# Patient Record
Sex: FEMALE | Race: OTHER | HISPANIC OR LATINO | URBAN - METROPOLITAN AREA
[De-identification: names, ages, dates, MRNs, and addresses within clinical notes are randomized per-mention and may not be internally consistent; named-entity substitution may affect disease eponyms.]

---

## 2022-07-05 ENCOUNTER — OUTPATIENT (OUTPATIENT)
Dept: OUTPATIENT SERVICES | Facility: HOSPITAL | Age: 42
LOS: 1 days | Discharge: HOME | End: 2022-07-05

## 2022-07-06 DIAGNOSIS — F31.9 BIPOLAR DISORDER, UNSPECIFIED: ICD-10-CM

## 2024-10-08 ENCOUNTER — EMERGENCY (EMERGENCY)
Facility: HOSPITAL | Age: 44
LOS: 1 days | Discharge: HOME | End: 2024-10-08
Attending: EMERGENCY MEDICINE
Payer: SELF-PAY

## 2024-10-08 VITALS
WEIGHT: 156.97 LBS | SYSTOLIC BLOOD PRESSURE: 127 MMHG | TEMPERATURE: 98 F | DIASTOLIC BLOOD PRESSURE: 85 MMHG | HEART RATE: 76 BPM | RESPIRATION RATE: 18 BRPM | OXYGEN SATURATION: 98 %

## 2024-10-08 PROCEDURE — 99284 EMERGENCY DEPT VISIT MOD MDM: CPT

## 2024-10-08 PROCEDURE — 99283 EMERGENCY DEPT VISIT LOW MDM: CPT

## 2024-10-08 RX ADMIN — Medication 600 MILLIGRAM(S): at 15:35

## 2024-10-08 RX ADMIN — Medication 600 MILLIGRAM(S): at 15:17

## 2024-10-08 NOTE — ED PROVIDER NOTE - NS ED ROS FT
Constitutional: (-) fever (-) chills  HENT: (-) congestion (-) rhinorrhea (-) sore throat (+) facial pain  Eyes: (-) pain (-) redness  Respiratory: (-) cough (-) shortness of breath (-) wheezing (-) stridor  Cardiovascular: (-) chest pain (-) palpitations (-) leg swelling  Gastrointestinal: (-) abdominal pain (-) blood in stool (no melena/hematochezia) (-) diarrhea (-) vomiting  Genitourinary: (-) dysuria (-) hematuria  Musculoskeletal: (-) gait problem (-) joint swelling (-) myalgias  Skin: (+) abrasion  Neurological: (-) weakness (-) numbness (-) headaches  Psychiatric/Behavioral: (-) confusion

## 2024-10-08 NOTE — ED PROVIDER NOTE - PATIENT PORTAL LINK FT
You can access the FollowMyHealth Patient Portal offered by [facility] by registering at the following website: [email or web address]. By joining [facility]’s FollowMyHealth portal, you will also be able to view your health information using other applications (apps) compatible with our system.

## 2024-10-08 NOTE — ED PROVIDER NOTE - NSFOLLOWUPCLINICS_GEN_ALL_ED_FT
[facility]  Internal Medicine  [address] [place] [address]  Phone: [phone]  Fax: [phone]  Follow Up Time: 1-3 Days

## 2024-10-08 NOTE — ED PROVIDER NOTE - OBJECTIVE STATEMENT
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   Patient presenting to the ED reporting closed head injury ~10 AM today.  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  + Wound to forehead.  No LOC.  No blood thinner use.  Patient in usual state health prior, no other injuries or complaints.

## 2024-10-08 NOTE — ED ADULT NURSE NOTE - OBJECTIVE STATEMENT
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 sustained lac on forehead no LOC

## 2024-10-08 NOTE — ED PROVIDER NOTE - PHYSICAL EXAMINATION
Gen:  Awake, alert, NAD, WDWN, non-toxic appearing. No skull/facial tender, no step-offs, no raccoon/mike.  Eyes:  PERRL, EOMI, no icterus, normal lids/lashes, normal conjunctivae.  ENT:  External inspection normal, pink/moist membranes. Pharynx normal, no pharyngeal erythema/exudate, no drooling, no lip/tongue/palate/posterior pharynx edema, midline uvula, no oropharyngeal ulcerations/lesions. No dental pain/tender, no loose teeth, no malocclusion, no bite deformity, no septal hematoma, no sinus/tmj/dental/temporal/mastoid tender.  CV:  S1S2, regular rate and rhythm, no murmur/gallops/rubs, no JVD, 2+ pulses b/l, no edema/cords/homans, good capillary refill, warm/well-perfused.  Resp:  Normal respiratory rate/effort, no respiratory distress, normal voice, speaking full sentences, lungs clear to auscultation b/l, no wheezing/rales/rhonchi, no retractions, no stridor.  Abd:  Soft abdomen, no tender/distended/guarding/rebound, no pulsatile mass, no CVA tender.   Musculoskeletal:  N/V intact, FROM all 4 extremities, normal motor tone, stable gait. Pelvis stable, no TLS spinal tender/deformity/step-offs, no rachel tender/deformity.  Neck:  FROM neck, supple, trachea midline, no meningismus. No C-spine tender/deformity/step-offs. Nexus negative.  Skin:  Color normal for race, warm and dry, no rash. R forehead 0.5cm linear superficial abrasion, no bleeding or evidence of infection.  Neuro:  Oriented x3, CN 2-12 intact, normal motor, normal sensory, normal gait. GCS 15  Psych:  Attention normal. Affect normal. Behavior normal. Judgment normal.

## 2024-10-08 NOTE — ED PROVIDER NOTE - CLINICAL SUMMARY MEDICAL DECISION MAKING FREE TEXT BOX
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   Patient presenting to the ED reporting closed head injury ~10 AM today.  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  + Wound to forehead.  No LOC.  No blood thinner use.  Patient in usual state health prior, no other injuries or complaints.    Vitals stable.  Nontoxic appearing, n/v intact.  Airway intact, no respiratory distress, no hypoxia.  No abdominal or CVA tenderness.  Nonfocal neuro.  Superficial abrasion noted to right forehead with no need for laceration repair.  Low suspicion of intracranial hemorrhage.  No indication for advanced imaging/CT at this time.    Analgesia given  Patient advised regarding need for close outpatient follow up.  Patient stable for further care in outpatient setting. No indication for inpatient admission at this time. Patient advised regarding symptomatic & supportive care and symptoms to prompt ED return. Strict return precautions provided.

## 2024-10-08 NOTE — ED PROVIDER NOTE - NSFOLLOWUPINSTRUCTIONS_ED_ALL_ED_FT
Please follow up with your PMD or Medicine Clinic in 2-3 days.  Return to the ER for worsening or concerning symptoms.  Your results for testing will be available in the Follow My Health application which can be downloaded to your phone or checked online on a computer.  [email or web address].  Take Acetaminophen (Tylenol) 650mg every 6 hours as needed for pain or fever.  Take Ibuprofen (Advil or Motrin) 600mg every 6 hours as needed for pain or fever with food.  Apply ice to swollen areas  Keep wound clean and dry.  Apply antibiotic ointment to affected area twice a day.    - - - - - - - - - - - - -  Head Injury, Adult  Three rear views of the head showing how quick, sudden head movements injure the brain.  There are many types of head injuries. They can be as minor as a small bump. Some head injuries can be worse. Worse injuries include:  A strong hit to the head that shakes the brain back and forth, causing damage (concussion).  A bruise (contusion) of the brain. This means there is bleeding in the brain that can cause swelling.  A cracked skull (skull fracture).  Bleeding in the brain that gathers, gets thick (makes a clot), and forms a bump (hematoma).  Most problems from a head injury come in the first 24 hours. However, you may still have side effects up to 7–10 days after your injury. It is important to watch your condition for any changes. You may need to be watched in the emergency department or urgent care, or you may need to stay in the hospital.    What are the causes?  There are many possible causes of a head injury. A serious head injury may be caused by:  A car accident.  Bicycle or motorcycle accidents.  Sports injuries.  Falls.  Being hit by an object.  What are the signs or symptoms?  Symptoms of a head injury include a bruise, bump, or bleeding where the injury happened. Other physical symptoms may include:  Headache.  Feeling like you may vomit (nauseous) or vomiting.  Dizziness.  Blurred or double vision.  Being uncomfortable around bright lights or loud noises.  Feeling tired.  Trouble waking up.  Severe symptoms such as:  Feeling weak or numb on one side of the body.  Slurred speech.  Swallowing problems.  Fainting.  Shaking movements that you cannot control (seizures).  Mental or emotional symptoms may include:  Feeling grumpy or cranky.  Confusion and memory problems.  Having trouble paying attention or concentrating.  Changes in eating or sleeping habits.  Feeling worried or nervous (anxious).  Feeling sad (depressed).  How is this treated?  Treatment for this condition depends on how bad the injury is and the type of injury you have. The main goal is to prevent problems and to allow the brain time to heal.    Mild head injury    If you have a mild head injury, you may be sent home, and treatment may include:  Being watched. A responsible adult should stay with you for 24 hours after your injury and check on you often.  Physical rest.  Brain rest.  Pain medicines.  Very bad head injury    If you have a very bad head injury, treatment may include:  Being watched closely. This includes staying in the hospital.  Medicines to:  Help with pain.  Prevent seizures.  Help with brain swelling.  Protecting your airway and using a machine that helps you breathe (ventilator).  Watching for and manage swelling inside the brain.  Brain surgery. This may be needed to:  Remove a collection of blood or blood clots.  Stop the bleeding.  Remove a part of the skull. This allows room for the brain to swell.  Follow these instructions at home:  Activity    Rest.  Avoid activities that are hard or tiring.  Make sure you get enough sleep.  Let your brain rest. Do fewer activities that need a lot of thought or attention, such as:  Watching TV.  Playing memory games and doing puzzles.  Job-related work or homework.  Working on the computer, social media, and texting.  Avoid activities that could cause another head injury until your doctor says it is okay. This includes playing sports.  Ask your doctor when it is safe for you to go back to your normal activities, such as work or school.  Ask your doctor when you can drive, ride a bicycle, or use machines. Do not do these activities if you are dizzy.  Lifestyle    A sign telling the reader not to drink beer, wine, or hard liquor.  Do not drink alcohol until your doctor says it is okay.  Do not use drugs.  If it is hard to remember things, write them down.  If you are easily distracted, try to do one thing at a time.  Talk with family members or close friends when making important decisions.  Tell your friends, family, a trusted co-worker, and  about your injury, symptoms, and limits (restrictions). Have them watch for any problems that are new or getting worse.  General instructions    Take over-the-counter and prescription medicines only as told by your doctor.  Have a responsible adult stay with you for 24 hours after your head injury. This person should watch you for any changes in your symptoms and be ready to get help.  Keep all follow-up visits to catch any new problems early.  How is this prevented?    Having another head injury can be dangerous. Another injury can lead to brain damage, brain swelling, or death. You can avoid this by:  Working on your balance and strength. This can help you avoid falls.  Wearing a seat belt when you are in a moving vehicle.  Wearing a helmet when you:  Ride a bicycle.  Ski.  Do any other sport or activity that has a risk of injury.  Making your home safer by:  Getting rid of clutter from the floors and stairs. This includes things that can make you trip.  Using grab bars in bathrooms and handrails by stairs.  Placing non-slip mats on floors and in bathtubs.  Putting more light in dim areas.  Where to find more information  Brain Injury Association: biausa.org  Contact a doctor if:  These symptoms do not go away:  Headaches.  Dizziness.  Double vision or vision changes.  Trouble sleeping.  Changes in mood.  You have new symptoms.  Get help right away if:  You have sudden:  Headache that is very bad.  Vomiting that does not stop.  Changes in the size of one of your pupils. Pupils are the black centers of your eyes.  Changes in how you see (vision).  More confusion or more grumpy moods.  You have a seizure.  Your symptoms get worse.  You have a clear or bloody fluid coming from your nose or ears.  These symptoms may be an emergency. Get help right away. Call 911.  Do not wait to see if the symptoms will go away.  Do not drive yourself to the hospital.  This information is not intended to replace advice given to you by your health care provider. Make sure you discuss any questions you have with your health care provider.

## 2024-10-23 ENCOUNTER — NON-APPOINTMENT (OUTPATIENT)
Age: 44
End: 2024-10-23

## 2024-10-27 ENCOUNTER — NON-APPOINTMENT (OUTPATIENT)
Age: 44
End: 2024-10-27

## 2024-10-28 ENCOUNTER — EMERGENCY (EMERGENCY)
Facility: HOSPITAL | Age: 44
LOS: 1 days | Discharge: HOME | End: 2024-10-28
Attending: STUDENT IN AN ORGANIZED HEALTH CARE EDUCATION/TRAINING PROGRAM
Payer: COMMERCIAL

## 2024-10-28 VITALS
RESPIRATION RATE: 18 BRPM | SYSTOLIC BLOOD PRESSURE: 131 MMHG | TEMPERATURE: 98 F | HEART RATE: 79 BPM | WEIGHT: 156.97 LBS | OXYGEN SATURATION: 98 % | DIASTOLIC BLOOD PRESSURE: 54 MMHG

## 2024-10-28 PROCEDURE — 99283 EMERGENCY DEPT VISIT LOW MDM: CPT | Mod: 25

## 2024-10-28 PROCEDURE — 99284 EMERGENCY DEPT VISIT MOD MDM: CPT

## 2024-10-28 PROCEDURE — 73562 X-RAY EXAM OF KNEE 3: CPT | Mod: 26,LT

## 2024-10-28 PROCEDURE — 73562 X-RAY EXAM OF KNEE 3: CPT

## 2024-10-28 RX ADMIN — Medication 600 MILLIGRAM(S): at 18:22

## 2024-10-28 NOTE — ED PROVIDER NOTE - NSFOLLOWUPCLINICS_GEN_ALL_ED_FT
[facility]  Orthopedics  [address] [place] [address]  Phone: [phone]  Fax: [phone]  Follow Up Time: 1-3 Days

## 2024-10-28 NOTE — ED PROVIDER NOTE - CLINICAL SUMMARY MEDICAL DECISION MAKING FREE TEXT BOX
Initial consideration in this patient included herniated intervertebral disc, acute ligamentous injury, acute muscle strain, spondylolisthesis and other musculoskeletal etiologies, cauda equina, spinal fracture, spinal stenosis, epidural abscess and hematoma, cancer metastases, and kidney stone among others.    H&P consistent with muscle strain/contusion. Prior to discharge, we discussed modified activity with emphasis on avoiding prolonged bedrest.  We discussed return precautions, specifically for worsening pain or focal neurologic deficits, treatment with [NSAIDs/muscle relaxants/etc.], and follow up with primary care doctor within one week for further evaluation, and the patient demonstrated understanding and agreement with this plan.  We specifically discussed follow up with primary care doctor for referral to physical therapy and consideration of outpatient imaging.

## 2024-10-28 NOTE — ED PROVIDER NOTE - NSFOLLOWUPINSTRUCTIONS_ED_ALL_ED_FT
You have been evaluated in the Emergency Department today for knee pain. Your evaluation did not find evidence of medical conditions requiring emergent intervention at this time.    We have provided crutches for you to use while your knee heals. Please rest, ice, and elevate your knee, and resume normal activities as tolerated.    We recommend you take 600mg ibuprofen every 6 hours or Tylenol 650mg every 6 hours as needed for pain. If needed, you can alternate these medications so that you take one medication every 3 hours. For instance, at noon take ibuprofen, then at 3pm take Tylenol, then at 6pm take ibuprofen.    Please schedule an appointment for follow up with your primary care physician this week.    Return to the Emergency Department if you experience worsening pain, numbness, tingling, change of color in your toes, or any other concerning symptoms.    Thank you for choosing us for your care

## 2024-10-28 NOTE — ED PROVIDER NOTE - CCCP TRG CHIEF CMPLNT
Sleep Hygiene. .. Tips for better sleep. .. Avoid naps. This will ensure you are sleepy at bedtime. If you have to take a nap, sleep less than 1 hour, before 3 pm.  Sleep only when sleepy; this reduces the time you are awake in bed. Regular exercise is recommended to help you deepen your sleep, but not within 4-6 hours of your bedtime. Timing of exercise is important, aim to exercise early in the morning or early afternoon. A light snack may help you fall asleep. Warm milk and foods high in the amino acid tryptophan, such as bananas, may help you to sleep  Be sure to avoid heavy, spicy or sugary foods 4-6 hours before bedtime and avoid at snack time. Stay away from stimulants such as caffeine and nicotine for at least 4-6 hours before bed. Stimulants can interfere with your ability to fall asleep. Caffeine is found in tea, cola, coffee, cocoa and chocolate and is best avoided at bedtime. Nicotine is found in tobacco products. Avoid alcohol 4-6 hours before bedtime. Alcohol has an immediate sleep-inducing effect, after a few hours when alcohol levels fall there is a stimulant or wake-up effect and will cause fragmented sleep. Sleep rituals are important. Give your body clues it is time to slow down and sleep. Examples include; yoga, deep breathing, listen to relaxing music, a hot bath or a few minutes of reading. Have a fixed bedtime and awakening time, Even on weekends! You will feel better keeping a regular sleep cycle, even if you are retired or not working. Get into your favorite sleep position. If not asleep in 30 minutes, get up and do something boring until you feel sleepy. Remember not to expose yourself to bright lights such as TV, phone or tablet screens. Only use your bed for sleeping. Do not use your bed as an office, workroom or recreation room. Use comfortable bedding. Uncomfortable bedding can prevent good sleep. Ensure your bedroom is quiet and comfortable.  A cooler room along with enough blankets to stay warm is recommended. If your room is too noisy, try a white noise machine. If too bright, try black out shades or an eye mask. Dont take worries to bed. Leave worries about work, school etc. behind you when you go to bed. Some people find it helpful to assign a worry period in the evening or late afternoon to write down your worries and get them out of your system. knee pain/injury

## 2024-10-28 NOTE — ED PROVIDER NOTE - OBJECTIVE STATEMENT
44 female presents for knee pain.  Patient reports she fell 1 week ago onto her left knee.  Reports she reinjured the knee when she fell again 3 days ago.  Reports her knee is locking and feels stiff.  Reports taking Tylenol with mild relief.

## 2024-10-28 NOTE — ED PROVIDER NOTE - PATIENT PORTAL LINK FT
You can access the FollowMyHealth Patient Portal offered by [facility] by registering at the following website: [email or web address]. By joining [employer]’s FollowMyHealth portal, you will also be able to view your health information using other applications (apps) compatible with our system.

## 2024-10-30 ENCOUNTER — APPOINTMENT (OUTPATIENT)
Age: 44
End: 2024-10-30
Payer: COMMERCIAL

## 2024-10-30 VITALS
WEIGHT: 157 LBS | SYSTOLIC BLOOD PRESSURE: 134 MMHG | HEART RATE: 85 BPM | HEIGHT: 59 IN | BODY MASS INDEX: 31.65 KG/M2 | OXYGEN SATURATION: 97 % | DIASTOLIC BLOOD PRESSURE: 68 MMHG

## 2024-10-30 DIAGNOSIS — Z83.3 FAMILY HISTORY OF DIABETES MELLITUS: ICD-10-CM

## 2024-10-30 DIAGNOSIS — S89.90XA UNSPECIFIED INJURY OF UNSPECIFIED LOWER LEG, INITIAL ENCOUNTER: ICD-10-CM

## 2024-10-30 DIAGNOSIS — Z85.6 PERSONAL HISTORY OF LEUKEMIA: ICD-10-CM

## 2024-10-30 DIAGNOSIS — Z82.49 FAMILY HISTORY OF ISCHEMIC HEART DISEASE AND OTHER DISEASES OF THE CIRCULATORY SYSTEM: ICD-10-CM

## 2024-10-30 PROBLEM — Z00.00 ENCOUNTER FOR PREVENTIVE HEALTH EXAMINATION: Status: ACTIVE | Noted: 2024-10-30

## 2024-10-30 PROCEDURE — 99204 OFFICE O/P NEW MOD 45 MIN: CPT

## 2024-10-30 RX ORDER — DICLOFENAC SODIUM 75 MG/1
75 TABLET, DELAYED RELEASE ORAL
Qty: 28 | Refills: 2 | Status: ACTIVE | COMMUNITY
Start: 2024-10-30 | End: 1900-01-01

## 2024-10-30 NOTE — HISTORY OF PRESENT ILLNESS
[de-identified] : MARIA TERESA MELO is a 44 year old female presenting with 1 week history of left-sided knee pain.  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  She has had multiple falls throughout her life most recently last week she was getting into a minivan and lost her balance twisted hit her knee and fell backwards.  She then fell again a few days later with significant pain in the left knee.  Went to urgent care where x-rays were done that were initially negative pain was not improving however she went to the emergency room and repeat x-rays did show a small minimally displaced avulsion fracture of the tibial spine.  Overall patient states initially it was very painful and the knee was locking up however she feels approximately 85% better since the initial injury at today's visit and states that the mechanical symptoms are resolving.

## 2024-10-30 NOTE — DISCUSSION/SUMMARY
[de-identified] : MARIA TERESA MELO is a 44 year old female presenting with 1 week history of left-sided knee pain after fall with x-rays revealing small minimally displaced tibial spine avulsion fracture.  Overall patient states her mechanical symptoms are resolving and she feels close to 90% improved.  Discussed treatment options including MRI and possible surgical intervention versus conservative care.  Discussed if patient's mechanical symptoms are resolving and she is feeling significantly better a trial of conservative measures is warranted.  Plan: 1.  Patient will continue with her knee sleeve 2.  Referral was given to physical therapy to work on her left knee pain as well as overall balance and proprioception in bilateral lower extremities given frequent falls throughout her life 3.  Patient prescribed diclofenac 75 mg to be taken twice a day as needed for pain 4.  Patient will follow-up in 6 to 8 weeks for reevaluation.  Can consider MRI at that time if needed.

## 2024-10-30 NOTE — PHYSICAL EXAM
[de-identified] : Knee (left)   Inspection  Skin: normal  Effusion: Mild Bursa swelling: none   Palpation  Tenderness: Mild Location: Diffusely throughout the knee  Crepitus: none.  Defect: none.  Popliteal fullness: negative.   Flexion  Active ROM: normal  Passive ROM: normal    Extension  Normal     Straight Leg Raise- can perform  Motor strength  Flexion: 5/5  Extension: 5/5   Sensory index  Normal.   ACL/PCL tests  Lachman test: stable  Anterior drawer: stable  Posterior drawer: stable   MCL/LCL tests  MCL laxity: stable  LCL laxity: stable   Patellofemoral tests  Patellar grind test: negative  Patellar apprehension: negative   Meniscal tests  Jeff's test: normal  [de-identified] : XR of left knee Date: 10/27/2024   Views: 3 views Performed at [facility]: Yes Impression:   Small cortical avulsed fracture of the medial superior tibial spine. Small suprapatellar joint effusion. If pain persists despite conservative therapy and soft tissue internal derangement or occult fracture is clinically suspected follow-up MRI recommended.  These images were personally reviewed with original findings documented as above.

## 2024-12-18 ENCOUNTER — APPOINTMENT (OUTPATIENT)
Age: 44
End: 2024-12-18
Payer: COMMERCIAL

## 2024-12-18 VITALS
BODY MASS INDEX: 31.65 KG/M2 | WEIGHT: 157 LBS | OXYGEN SATURATION: 99 % | HEIGHT: 59 IN | SYSTOLIC BLOOD PRESSURE: 130 MMHG | DIASTOLIC BLOOD PRESSURE: 70 MMHG | HEART RATE: 87 BPM

## 2024-12-18 DIAGNOSIS — S89.90XA UNSPECIFIED INJURY OF UNSPECIFIED LOWER LEG, INITIAL ENCOUNTER: ICD-10-CM

## 2024-12-18 PROCEDURE — 99213 OFFICE O/P EST LOW 20 MIN: CPT

## 2024-12-18 NOTE — PHYSICAL EXAM
[de-identified] : Knee (left)   Inspection  Skin: normal  Effusion: None Bursa swelling: none   Palpation  Tenderness: None Location: N/A  Crepitus: none.  Defect: none.  Popliteal fullness: negative.   Flexion  Active ROM: normal  Passive ROM: normal    Extension  Normal     Straight Leg Raise- can perform  Motor strength  Flexion: 5/5  Extension: 5/5   Sensory index  Normal.   ACL/PCL tests  Lachman test: stable  Anterior drawer: stable  Posterior drawer: stable   MCL/LCL tests  MCL laxity: stable  LCL laxity: stable   Patellofemoral tests  Patellar grind test: negative  Patellar apprehension: negative   Meniscal tests  Jeff's test: normal  [de-identified] : XR of left knee Date: 10/27/2024   Views: 3 views Performed at [facility]: Yes Impression:   Small cortical avulsed fracture of the medial superior tibial spine. Small suprapatellar joint effusion. If pain persists despite conservative therapy and soft tissue internal derangement or occult fracture is clinically suspected follow-up MRI recommended.  These images were personally reviewed with original findings documented as above.

## 2024-12-18 NOTE — DISCUSSION/SUMMARY
[de-identified] : MARIA TERESA MELO is a 44 year old female presenting with 1 week history of left-sided knee pain after fall with x-rays revealing small minimally displaced tibial spine avulsion fracture.  Overall patient states her mechanical symptoms are resolving and she feels close to 90% improved.  Discussed treatment options including MRI and possible surgical intervention versus conservative care.  Discussed if patient's mechanical symptoms are resolving and she is feeling significantly better a trial of conservative measures is warranted.  Patient with significant improvement with physical therapy and knee sleeve as needed.  Diclofenac as needed.  Would like to continue physical therapy.  Plan: 1.  Continue knee sleeve and diclofenac as needed 2.  Updated prescription for continuing physical therapy for provided 3.  Patient will follow-up as needed

## 2024-12-18 NOTE — HISTORY OF PRESENT ILLNESS
[de-identified] : MARIA TERESA MELO is a 44 year old female presenting with 1 week history of left-sided knee pain.  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  She has had multiple falls throughout her life most recently last week she was getting into a minivan and lost her balance twisted hit her knee and fell backwards.  She then fell again a few days later with significant pain in the left knee.  Went to urgent care where x-rays were done that were initially negative pain was not improving however she went to the emergency room and repeat x-rays did show a small minimally displaced avulsion fracture of the tibial spine.  Overall patient states initially it was very painful and the knee was locking up however she feels approximately 85% better since the initial injury at today's visit and states that the mechanical symptoms are resolving.  Interval history: Patient states she is approximately 90% better and has really enjoyed going to physical therapy and thinks it has been very beneficial.  She is approved for 6 more sessions but would like to continue past that for continued improvement.